# Patient Record
Sex: FEMALE | Race: WHITE | NOT HISPANIC OR LATINO | Employment: FULL TIME | URBAN - METROPOLITAN AREA
[De-identification: names, ages, dates, MRNs, and addresses within clinical notes are randomized per-mention and may not be internally consistent; named-entity substitution may affect disease eponyms.]

---

## 2017-07-18 ENCOUNTER — GENERIC CONVERSION - ENCOUNTER (OUTPATIENT)
Dept: OTHER | Facility: OTHER | Age: 42
End: 2017-07-18

## 2017-11-29 ENCOUNTER — ALLSCRIPTS OFFICE VISIT (OUTPATIENT)
Dept: OTHER | Facility: OTHER | Age: 42
End: 2017-11-29

## 2018-01-10 NOTE — CONSULTS
Chief Complaint  evaluation regarding leukopenia  History of Present Illness  2014- had venous surgery  WBC 3k at that time  September 2016- had pain right >left lower quadrant pain radiating to the right flank  Went to walk in clinic  Given muscle relaxers  Established with Dr Kiet Sullivan and given Naproxyn  Review of Systems    Constitutional: No fever, no chills, feels well, no tiredness, no recent weight gain or weight loss  Eyes: No complaints of eye pain, no red eyes, no eyesight problems, no discharge, no dry eyes, no itching of eyes  ENT: no complaints of earache, no loss of hearing, no nose bleeds, no nasal discharge, no sore throat, no hoarseness  Cardiovascular: No complaints of slow heart rate, no fast heart rate, no chest pain, no palpitations, no leg claudication, no lower extremity edema  Respiratory: No complaints of shortness of breath, no wheezing, no cough, no SOB on exertion, no orthopnea, no PND  Gastrointestinal: bloody stools and hematochezia this am , but no nausea and no vomiting  Genitourinary: menses q 28, regular , but No complaints of dysuria, no incontinence, no pelvic pain, no dysmenorrhea, no vaginal discharge or bleeding  Musculoskeletal: No complaints of arthralgias, no myalgias, no joint swelling or stiffness, no limb pain or swelling  Integumentary: No complaints of skin rash or lesions, no itching, no skin wounds, no breast pain or lump  Neurological: No complaints of headache, no confusion, no convulsions, no numbness, no dizziness or fainting, no tingling, no limb weakness, no difficulty walking  Psychiatric: Not suicidal, no sleep disturbance, no anxiety or depression, no change in personality, no emotional problems  Endocrine: No complaints of proptosis, no hot flashes, no muscle weakness, no deepening of the voice, no feelings of weakness     Hematologic/Lymphatic: No complaints of swollen glands, no swollen glands in the neck, does not bleed easily, does not bruise easily  Active Problems    1  Acute right-sided low back pain with right-sided sciatica (724 2,724 3) (M54 41)   2  Constipation (564 00) (K59 00)   3  Fatigue (780 79) (R53 83)   4  Leukopenia (288 50) (D72 819)   5  Need for lipid screening (V77 91) (Z13 220)   6  Neutropenia (288 00) (D70 9)   7  Screening for diabetes mellitus (V77 1) (Z13 1)   8  Screening for thyroid disorder (V77 0) (Z13 29)   9  Strain of lumbar region, initial encounter (847 2) (K35 084H)    Past Medical History    · History of malignant melanoma (V10 82) (R77 455)   · History of urinary frequency (V13 09) (I79 637)   · History of Postoperative state (V45 89) (Z98 890)   · History of Skin Cancer (V10 83)   · Urinary tract infection (599 0) (N39 0)   · History of Varicose veins of lower extremity with other complication (529 0) (T42 192)   · History of Varicose veins with pain (454 8) (I83 819)    Surgical History    · History of Biopsy Breast Open   · History of  Section   · History of Endovenous Ablation Of Incompetent Vein Laser   · History of Endovenous Ablation Of Incompetent Vein Laser   · History of Stab Phlebectomy Of Varicose Veins   · History of Stab Phlebectomy Varicose Veins More Than 20 Stab Incisions    The surgical history was reviewed and updated today  Family History    · Family history of malignant neoplasm of kidney (V16 51) (Z80 51)   · Family history of Melanoma   · Family history of Varicose Veins Of Lower Extremities    · Family history of benign essential tremor (V17 2) (Z82 0)   · Family history of hypertension (V17 49) (Z82 49)   · Family history of Kidney stone on left side    The family history was reviewed and updated today  Social History    · Never A Smoker   · Social alcohol use (Z78 9)   · Working Part-time  The social history was reviewed and updated today  Current Meds   1   Naproxen 500 MG Oral Tablet; TAKE 1 TABLET BY MOUTH TWICE A DAY WITH FOOD   FOR 14 DAYS, THEN AS NEEDED; Therapy: 46ASF2200 to [phone])  Requested for: 93YHB9493; Last   Rx:00Ckn1993 Ordered    The medication list was reviewed and updated today  Allergies    1  No Known Drug Allergies    Vitals   Recorded: 99DCH5893 67:88VU   Systolic 492   Diastolic 78   Heart Rate 77   Respiration 18   Temperature 99 5 F   Pain Scale 5   O2 Saturation 99   Height 5 ft 4 in   Weight 168 lb 4 00 oz   BMI Calculated 28 88   BSA Calculated 1 83     Physical Exam    Constitutional   General appearance: No acute distress, well appearing and well nourished  Eyes   Conjunctiva and lids: No swelling, erythema or discharge  Ears, Nose, Mouth, and Throat   External inspection of ears and nose: Normal     Oropharynx: Normal with no erythema, edema, exudate or lesions  Pulmonary   Auscultation of lungs: Clear to auscultation  Cardiovascular   Auscultation of heart: Normal rate and rhythm, normal S1 and S2, without murmurs  Examination of extremities for edema and/or varicosities: Normal     Abdomen   Abdomen: Non-tender, no masses  Liver and spleen: No hepatomegaly or splenomegaly  Lymphatic   Palpation of lymph nodes in neck: No lymphadenopathy  Musculoskeletal   Gait and station: Normal     Skin   Skin and subcutaneous tissue: Normal without rashes or lesions  Neurologic   Cranial nerves: Cranial nerves 2-12 intact  Psychiatric   Orientation to person, place, and time: Normal          Assessment    1  Social alcohol use (Z78 9)   2  Acute right-sided low back pain with right-sided sciatica (724 2,724 3) (M54 41)   3  Leukopenia (288 50) (D72 819)    Plan  Leukopenia    · Drink plenty of fluids ; Status:Complete;   Done: 37ALA0723   Ordered; For:Leukopenia; Ordered By:Kaushik Amin;   · Follow-up PRN Evaluation and Treatment  Follow-up  will arrange prn  Status: Complete  Done: 64AWQ4872   Ordered; For: Leukopenia;  Ordered By: Mihaela Menchaca Performed:  Due: 20LGY8138   · * CT ABDOMEN PELVIS W CONTRAST; Status:Need Information - Financial Authorization; Requested CZW:[id number];    Perform:[facility]; OZR:44VXC7926;TEMO; For:Leukopenia; Ordered By:Kaushik Amin;    Discussion/Summary    In summary, this is a 42-year-old female history of leukopenia as outlined  Her white count seems to be fluctuating in the 3-4 range  Hemoglobin, platelets and differential have essentially been normal, although on one occasion she did have slight lymphoid predominance  I suspect that her leukopenia is native and may represent absolute neutropenia, mild  Observation seems most appropriate in this regard  Additionally, she has moderate to severe right sided hip and pelvic pain  This occurred fairly abruptly and has not really gotten much better since then  She does take Naprosyn with some relief, but requires continued Naprosyn over the past few weeks  She does have a personal history of melanoma and metastatic workup is reasonable given this pain pattern  Additionally, she has a family history of renal cell carcinoma as concerned about that possibility  We discussed the possibility of x-ray versus CAT scan  I would favor CAT scan as it gives us more information regarding soft tissue and bony disease, all in one test, etc   The patient and her  voiced understanding above discussion  We made arrangements for CT in the near future and we will review the results with her  Further workup would follow accordingly  The patient and her  voiced understanding and agreement  The treatment plan was reviewed with the patient/guardian  The patient/guardian understands and agrees with the treatment plan   The patient was counseled regarding diagnostic results, instructions for management, patient and family education, impressions  total time of encounter was 25 minutes        Signatures   Electronically signed by : MEG Jaramillo ,DO; Oct  3 2016  4:31PM EST (Author)

## 2018-01-11 NOTE — CONSULTS
Chief Complaint  HERE TODAY FOR PRE OP EXAM FOR EYE SURGERY- DR Severo Etienne      History of Present Illness  Pre-Op Visit (Brief): The patient is being seen for a preoperative visit  The procedure is a(n) ptosis scheduled for 12/15/2017 with Brianne  The indication for surgery is decrease in vision  Surgical Risk Assessment:   Prior Anesthesia: She had prior anesthesia, no prior adverse reaction to edidural anesthesia, no prior adverse reaction to spinal anesthesia and no prior adverse reaction to general anesthesia  Exercise Capacity: able to walk four blocks without symptoms and able to walk two flights of stairs without symptoms  Lifestyle Factors: denies tobacco use, denies illegal drug use and social  Symptoms: no easy bleeding, no easy bruising, no heavy menses, no frequent nosebleeds, no chest pain, no cough, no dyspnea, no edema, no palpitations and no wheezing  Pertinent Family History: no pertinent family history  Living Situation: home is secure and supportive and no post-op concerns with her living situation  HPI: Patient is here for checkup prior to surgery for ptosis of eye lid Patient has noticed that her vision is effected by it Patient has no other concerns Patient denies any chest pain, shortness of breath No abdomen issues and no urinaty or URI complaints      Review of Systems    Constitutional: No fever, no chills, feels well, no tiredness, no recent weight gain or weight loss  Eyes: eyesight problems, but no eye pain  ENT: no complaints of earache, no loss of hearing, no nose bleeds, no nasal discharge, no sore throat, no hoarseness  Cardiovascular: no chest pain, no intermittent leg claudication, no palpitations and no lower extremity edema  Respiratory: No complaints of shortness of breath, no wheezing, no cough, no SOB on exertion, no orthopnea, no PND  Gastrointestinal: No complaints of abdominal pain, no constipation, no nausea or vomiting, no diarrhea, no bloody stools  Genitourinary: No complaints of dysuria, no incontinence, no pelvic pain, no dysmenorrhea, no vaginal discharge or bleeding  Musculoskeletal: no arthralgias and no myalgias  Integumentary: no rashes  Neurological: No complaints of headache, no confusion, no convulsions, no numbness, no dizziness or fainting, no tingling, no limb weakness, no difficulty walking  Psychiatric: no anxiety, no sleep disturbances and no depression  Active Problems    1  Cyst of left ovary (620 2) (N83 202)   2  Lesion of liver (573 8) (K76 9)   3  Leukopenia (288 50) (D72 819)   4  Neutropenia (288 00) (D70 9)    Past Medical History    · History of Acute right-sided low back pain with right-sided sciatica (724 2,724 3) (M54 41)   · History of constipation (V12 79) (Z87 19)   · History of fatigue (V13 89) (N37 493)   · History of malignant melanoma (V10 82) (Z85 820)   · History of urinary frequency (V13 09) (S70 692)   · History of Need for lipid screening (V77 91) (Z13 220)   · History of Postoperative state (V45 89) (Z98 890)   · History of Screening for diabetes mellitus (V77 1) (Z13 1)   · History of Screening for thyroid disorder (V77 0) (Z13 29)   · History of Skin Cancer (V10 83)   · History of Strain of lumbar region, initial encounter (847 2) (S39 012A)   · Urinary tract infection (599 0) (N39 0)   · History of Varicose veins of lower extremity with other complication (225 4) (L09 229)   · History of Varicose veins with pain (454 8) (I83 819)    The active problems and past medical history were reviewed and updated today  Surgical History    · History of Biopsy Breast Open   · History of  Section   · History of Endovenous Ablation Of Incompetent Vein Laser   · History of Endovenous Ablation Of Incompetent Vein Laser   · History of Stab Phlebectomy Of Varicose Veins   · History of Stab Phlebectomy Varicose Veins More Than 20 Stab Incisions    The surgical history was reviewed and updated today  Family History    · Family history of malignant neoplasm of kidney (V16 51) (Z80 51)   · Family history of Melanoma   · Family history of Varicose Veins Of Lower Extremities    · Family history of benign essential tremor (V17 2) (Z82 0)   · Family history of hypertension (V17 49) (Z82 49)   · Family history of Kidney stone on left side    The family history was reviewed and updated today  Social History    · Never A Smoker   · Social alcohol use (Z78 9)   · Working Part-time  The social history was reviewed and is unchanged  Current Meds    The medication list was reviewed and updated today  Allergies    1  No Known Drug Allergies    Vitals  Signs    Temperature: 83 8 F  Systolic: 551  Diastolic: 80  Height: 5 ft 4 in  Weight: 178 lb   BMI Calculated: 30 55  BSA Calculated: 1 86    Physical Exam    Constitutional   General appearance: No acute distress, well appearing and well nourished  Head and Face   Head and face: Normal     Palpation of the face and sinuses: No sinus tenderness  Eyes   Conjunctiva and lids: Abnormal   ptosis of both upper eyelids  Pupils and irises: Equal, round, reactive to light  Ears, Nose, Mouth, and Throat   External inspection of ears and nose: Normal     Otoscopic examination: Tympanic membranes translucent with normal light reflex  Canals patent without erythema  Hearing: Normal     Nasal mucosa, septum, and turbinates: Normal without edema or erythema  Lips, teeth, and gums: Normal, good dentition  Oropharynx: Normal with no erythema, edema, exudate or lesions  Neck   Neck: Supple, symmetric, trachea midline, no masses  Thyroid: Normal, no thyromegaly  Pulmonary   Respiratory effort: No increased work of breathing or signs of respiratory distress  Auscultation of lungs: Clear to auscultation  Cardiovascular   Auscultation of heart: Normal rate and rhythm, normal S1 and S2, no murmurs  Carotid pulses: 2+ bilaterally      Peripheral vascular exam: Normal     Examination of extremities for edema and/or varicosities: Normal     Abdomen   Abdomen: Non-tender, no masses  Liver and spleen: No hepatomegaly or splenomegaly  Examination for hernias: No hernia appreciated  Lymphatic   Palpation of lymph nodes in neck: No lymphadenopathy  Musculoskeletal   Gait and station: Normal     Digits and nails: Normal without clubbing or cyanosis  Joints, bones, and muscles: Normal     Range of motion: Normal     Stability: Normal     Muscle strength/tone: Normal     Skin   Skin and subcutaneous tissue: Normal without rashes or lesions  Neurologic   Cranial nerves: Cranial nerves II-XII intact  Reflexes: 2+ and symmetric  Sensation: No sensory loss  Coordination: Normal finger to nose and heel to shin  Psychiatric   Judgment and insight: Normal     Orientation to person, place, and time: Normal     Recent and remote memory: Intact  Mood and affect: Normal        Assessment    1  Ptosis, bilateral (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 30) (H02 403)   2  Preop examination (V72 84) (Z01 818)   3  Leukopenia (288 50) (D72 819)    Discussion/Summary  Surgical Clearance: She is at a LOW risk from a cardiovascular standpoint at this time without any additional cardiac testing  Reevaluation needed, if she should present with symptoms prior to surgery/procedure  Surgical clearance faxed to Dr Isaias Isabel   Patient has had history of mild leukopenia since 2012 that has been stable Patient is following with dermatology for her melanoma history Patient is cleared for surgery        Signatures   Electronically signed by : Brody Colby DO; Nov 29 2017  8:21AM EST                       (Author)

## 2018-01-14 VITALS
HEIGHT: 64 IN | WEIGHT: 178 LBS | DIASTOLIC BLOOD PRESSURE: 80 MMHG | BODY MASS INDEX: 30.39 KG/M2 | TEMPERATURE: 98.3 F | SYSTOLIC BLOOD PRESSURE: 138 MMHG

## 2018-01-14 NOTE — RESULT NOTES
Verified Results  [address] [id number] 08:42AM Awa Hemphill Order Number: [id number]    - Patient Instructions: To schedule this appointment, please contact Central Scheduling at [phone]   Order Number: [id number]    - Patient Instructions: To schedule this appointment, please contact Central Scheduling at [phone]  Test Name Result Flag Reference   US LIVER (Report)     RIGHT UPPER QUADRANT ULTRASOUND     INDICATION: Follow-up left liver cyst seen on prior CT  Abdominal pain  COMPARISON: None  TECHNIQUE:  Real-time ultrasound of the right upper quadrant was performed with a curvilinear transducer with both volumetric sweeps and still imaging techniques  FINDINGS:     Exam is limited due to overlying bowel gas  PANCREAS: Portions of the pancreas are obscured by bowel gas  Visualized portions of the pancreas are unremarkable  AORTA AND IVC: Obscured by bowel gas  LIVER:   Size: Within normal range  The liver measures 13 7 cm in the midclavicular line  Contour: Surface contour is smooth  Parenchyma: Echogenicity and echotexture are within normal limits  No evidence of suspicious mass  The liver lesions seen on prior CT is shown to be a 1 7 x 2 1 x 1 4 cm simple cyst in the dome of the liver  The main portal vein is patent and hepatopetal       BILIARY:   The gallbladder is normal in caliber  No wall thickening or pericholecystic fluid  No stones or sludge identified  No sonographic Sosa's sign  No intrahepatic biliary dilatation  CBD measures 2 mm  No choledocholithiasis  KIDNEY:    Right kidney measures 10 3 cm  Within normal limits  ASCITES:  None  IMPRESSION:     The liver lesions seen on prior CT is shown to be a 1 7 x 2 1 x 1 4 cm simple cyst in the dome of the liver         Workstation performed: [id number]     Signed by:   Bertha Kerns MD   10/14/16

## 2018-01-17 NOTE — CONSULTS
Assessment    1  Social alcohol use (Z78 9)   2  Acute right-sided low back pain with right-sided sciatica (724 2,724 3) (M54 41)   3  Leukopenia (288 50) (D72 819)    Plan  Leukopenia    · Drink plenty of fluids ; Status:Complete;   Done: 28LBV0105   Ordered; For:Leukopenia; Ordered By:Ramesh Amin;   · Follow-up PRN Evaluation and Treatment  Follow-up  will arrange prn  Status: Complete  Done: 36HDY2732   Ordered; For: Leukopenia; Ordered By: Ariane Laboy Performed:  Due: 20OXE2814   · * CT ABDOMEN PELVIS W CONTRAST; Status:Need Information - Financial Authorization; Requested RCP:[id number];    Perform:[facility]; POY:[id number];DSBOFBD; For:Leukopenia; Ordered By:Ramesh Amin;    Discussion/Summary    In summary, this is a 77-year-old female history of leukopenia as outlined  Her white count seems to be fluctuating in the 3-4 range  Hemoglobin, platelets and differential have essentially been normal, although on one occasion she did have slight lymphoid predominance  I suspect that her leukopenia is native and may represent absolute neutropenia, mild  Observation seems most appropriate in this regard  Additionally, she has moderate to severe right sided hip and pelvic pain  This occurred fairly abruptly and has not really gotten much better since then  She does take Naprosyn with some relief, but requires continued Naprosyn over the past few weeks  She does have a personal history of melanoma and metastatic workup is reasonable given this pain pattern  Additionally, she has a family history of renal cell carcinoma as concerned about that possibility  We discussed the possibility of x-ray versus CAT scan  I would favor CAT scan as it gives us more information regarding soft tissue and bony disease, all in one test, etc   The patient and her  voiced understanding above discussion  We made arrangements for CT in the near future and we will review the results with her   Further workup would follow accordingly  The patient and her  voiced understanding and agreement  The treatment plan was reviewed with the patient/guardian  The patient/guardian understands and agrees with the treatment plan   The patient was counseled regarding diagnostic results, instructions for management, patient and family education, impressions  total time of encounter was 25 minutes  Chief Complaint  evaluation regarding leukopenia  History of Present Illness  2014- had venous surgery  WBC 3k at that time  September 2016- had pain right >left lower quadrant pain radiating to the right flank  Went to walk in clinic  Given muscle relaxers  Established with Dr Babar Muller and given Naproxyn  Review of Systems    Constitutional: No fever, no chills, feels well, no tiredness, no recent weight gain or weight loss  Eyes: No complaints of eye pain, no red eyes, no eyesight problems, no discharge, no dry eyes, no itching of eyes  ENT: no complaints of earache, no loss of hearing, no nose bleeds, no nasal discharge, no sore throat, no hoarseness  Cardiovascular: No complaints of slow heart rate, no fast heart rate, no chest pain, no palpitations, no leg claudication, no lower extremity edema  Respiratory: No complaints of shortness of breath, no wheezing, no cough, no SOB on exertion, no orthopnea, no PND  Gastrointestinal: bloody stools and hematochezia this am , but no nausea and no vomiting  Genitourinary: menses q 28, regular , but No complaints of dysuria, no incontinence, no pelvic pain, no dysmenorrhea, no vaginal discharge or bleeding  Musculoskeletal: No complaints of arthralgias, no myalgias, no joint swelling or stiffness, no limb pain or swelling  Integumentary: No complaints of skin rash or lesions, no itching, no skin wounds, no breast pain or lump     Neurological: No complaints of headache, no confusion, no convulsions, no numbness, no dizziness or fainting, no tingling, no limb weakness, no difficulty walking  Psychiatric: Not suicidal, no sleep disturbance, no anxiety or depression, no change in personality, no emotional problems  Endocrine: No complaints of proptosis, no hot flashes, no muscle weakness, no deepening of the voice, no feelings of weakness  Hematologic/Lymphatic: No complaints of swollen glands, no swollen glands in the neck, does not bleed easily, does not bruise easily  Active Problems    1  Acute right-sided low back pain with right-sided sciatica (724 2,724 3) (M54 41)   2  Constipation (564 00) (K59 00)   3  Fatigue (780 79) (R53 83)   4  Leukopenia (288 50) (D72 819)   5  Need for lipid screening (V77 91) (Z13 220)   6  Neutropenia (288 00) (D70 9)   7  Screening for diabetes mellitus (V77 1) (Z13 1)   8  Screening for thyroid disorder (V77 0) (Z13 29)   9  Strain of lumbar region, initial encounter (847 2) (L31 884T)    Past Medical History    1  History of malignant melanoma (V10 82) (Z85 820)   2  History of urinary frequency (V13 09) (Z87 898)   3  History of Postoperative state (V45 89) (Z98 890)   4  History of Skin Cancer (V10 83)   5  Urinary tract infection (599 0) (N39 0)   6  History of Varicose veins of lower extremity with other complication (205 3) (T12 922)   7  History of Varicose veins with pain (454 8) (I83 819)    Surgical History    1  History of Biopsy Breast Open   2  History of  Section   3  History of Endovenous Ablation Of Incompetent Vein Laser   4  History of Endovenous Ablation Of Incompetent Vein Laser   5  History of Stab Phlebectomy Of Varicose Veins   6  History of Stab Phlebectomy Varicose Veins More Than 20 Stab Incisions    The surgical history was reviewed and updated today  Family History  Mother    1  Family history of malignant neoplasm of kidney (V16 51) (Z80 51)   2  Family history of Melanoma   3  Family history of Varicose Veins Of Lower Extremities  Father    4   Family history of benign essential tremor (V17 2) (Z82 0)   5  Family history of hypertension (V17 49) (Z82 49)   6  Family history of Kidney stone on left side    The family history was reviewed and updated today  Social History    · Never A Smoker   · Social alcohol use (Z78 9)   · Working Part-time  The social history was reviewed and updated today  Current Meds   1  Naproxen 500 MG Oral Tablet; TAKE 1 TABLET BY MOUTH TWICE A DAY WITH FOOD   FOR 14 DAYS, THEN AS NEEDED; Therapy: 50ADK5594 to 798 660 361)  Requested for: 89ATJ3235; Last   Rx:[id number] Ordered    The medication list was reviewed and updated today  Allergies    1  No Known Drug Allergies    Vitals  Vital Signs    Recorded: 86ORW6605 44:37MO   Systolic 346   Diastolic 78   Heart Rate 77   Respiration 18   Temperature 99 5 F   Pain Scale 5   O2 Saturation 99   Height 5 ft 4 in   Weight 168 lb 4 00 oz   BMI Calculated 28 88   BSA Calculated 1 83     Physical Exam    Constitutional   General appearance: No acute distress, well appearing and well nourished  Eyes   Conjunctiva and lids: No swelling, erythema or discharge  Ears, Nose, Mouth, and Throat   External inspection of ears and nose: Normal     Oropharynx: Normal with no erythema, edema, exudate or lesions  Pulmonary   Auscultation of lungs: Clear to auscultation  Cardiovascular   Auscultation of heart: Normal rate and rhythm, normal S1 and S2, without murmurs  Examination of extremities for edema and/or varicosities: Normal     Abdomen   Abdomen: Non-tender, no masses  Liver and spleen: No hepatomegaly or splenomegaly  Lymphatic   Palpation of lymph nodes in neck: No lymphadenopathy  Musculoskeletal   Gait and station: Normal     Skin   Skin and subcutaneous tissue: Normal without rashes or lesions  Neurologic   Cranial nerves: Cranial nerves 2-12 intact      Psychiatric   Orientation to person, place, and time: Normal          Future Appointments    Date/Time Provider Specialty Site   10/07/2016 08:30 AM Thomas Mcleod DO Family Medicine [facility]     Signatures   Electronically signed by : MEG Mcdaniel ,DO; Oct  3 2016  4:31PM EST                       (Author)

## 2019-04-03 ENCOUNTER — OFFICE VISIT (OUTPATIENT)
Dept: URGENT CARE | Age: 44
End: 2019-04-03
Payer: COMMERCIAL

## 2019-04-03 VITALS
OXYGEN SATURATION: 99 % | HEIGHT: 64 IN | HEART RATE: 78 BPM | SYSTOLIC BLOOD PRESSURE: 158 MMHG | WEIGHT: 166 LBS | BODY MASS INDEX: 28.34 KG/M2 | TEMPERATURE: 98.5 F | RESPIRATION RATE: 16 BRPM | DIASTOLIC BLOOD PRESSURE: 79 MMHG

## 2019-04-03 DIAGNOSIS — L03.011 CELLULITIS OF RIGHT FINGER: Primary | ICD-10-CM

## 2019-04-03 PROCEDURE — 99203 OFFICE O/P NEW LOW 30 MIN: CPT | Performed by: PHYSICIAN ASSISTANT

## 2019-04-03 RX ORDER — CEPHALEXIN 500 MG/1
500 CAPSULE ORAL EVERY 8 HOURS SCHEDULED
Qty: 21 CAPSULE | Refills: 0 | Status: SHIPPED | OUTPATIENT
Start: 2019-04-03 | End: 2019-04-10

## 2021-04-08 DIAGNOSIS — Z23 ENCOUNTER FOR IMMUNIZATION: ICD-10-CM
